# Patient Record
Sex: MALE | Race: WHITE | NOT HISPANIC OR LATINO | Employment: UNEMPLOYED | URBAN - METROPOLITAN AREA
[De-identification: names, ages, dates, MRNs, and addresses within clinical notes are randomized per-mention and may not be internally consistent; named-entity substitution may affect disease eponyms.]

---

## 2019-03-31 ENCOUNTER — HOSPITAL ENCOUNTER (EMERGENCY)
Facility: OTHER | Age: 24
Discharge: HOME | End: 2019-03-31
Attending: EMERGENCY MEDICINE
Payer: MEDICAID

## 2019-03-31 VITALS
TEMPERATURE: 97 F | SYSTOLIC BLOOD PRESSURE: 113 MMHG | BODY MASS INDEX: 28.79 KG/M2 | OXYGEN SATURATION: 98 % | RESPIRATION RATE: 18 BRPM | WEIGHT: 190 LBS | HEART RATE: 102 BPM | DIASTOLIC BLOOD PRESSURE: 74 MMHG | HEIGHT: 68 IN

## 2019-03-31 DIAGNOSIS — M25.562 CHRONIC PAIN OF LEFT KNEE: Primary | ICD-10-CM

## 2019-03-31 DIAGNOSIS — G89.29 CHRONIC PAIN OF LEFT KNEE: Primary | ICD-10-CM

## 2019-03-31 DIAGNOSIS — M25.552 CHRONIC HIP PAIN, BILATERAL: ICD-10-CM

## 2019-03-31 DIAGNOSIS — G89.29 CHRONIC HIP PAIN, BILATERAL: ICD-10-CM

## 2019-03-31 DIAGNOSIS — M25.551 CHRONIC HIP PAIN, BILATERAL: ICD-10-CM

## 2019-03-31 PROCEDURE — 96372 THER/PROPH/DIAG INJ SC/IM: CPT

## 2019-03-31 PROCEDURE — 63600175 PHARM REV CODE 636 W HCPCS: Performed by: EMERGENCY MEDICINE

## 2019-03-31 PROCEDURE — 99284 EMERGENCY DEPT VISIT MOD MDM: CPT | Mod: 25

## 2019-03-31 RX ORDER — KETOROLAC TROMETHAMINE 30 MG/ML
30 INJECTION, SOLUTION INTRAMUSCULAR; INTRAVENOUS
Status: COMPLETED | OUTPATIENT
Start: 2019-03-31 | End: 2019-03-31

## 2019-03-31 RX ORDER — IBUPROFEN 600 MG/1
600 TABLET ORAL EVERY 6 HOURS PRN
Qty: 9 TABLET | Refills: 0 | Status: SHIPPED | OUTPATIENT
Start: 2019-03-31

## 2019-03-31 RX ADMIN — KETOROLAC TROMETHAMINE 30 MG: 30 INJECTION, SOLUTION INTRAMUSCULAR; INTRAVENOUS at 09:03

## 2019-03-31 NOTE — ED TRIAGE NOTES
Pt c/o bilateral hip & left knee pain X 1 week, progressively worsening. Pt reports pain exacerbated with ambulation & general movement, mitigated with rest. Denies lower extremity numbness.  Pt denies relief from [facility]ocaine patch [facility] per [facility].
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
## 2019-03-31 NOTE — ED NOTES
Patient Identifiers for Hernan Malhotra checked and correct  LOC: The patient is awake, alert and aware of environment with an appropriate affect, the patient is oriented x 3 and speaking appropriate.  APPEARANCE: Patient resting comfortably and in no acute distress. The patient is clean and well groomed. The patient's clothing is properly fastened.  SKIN: The skin is warm and dry. The patient has normal skin turgor and moist mucus membranes. No rashes or lesions upon observation. Skin Intact , no breakdown noted.  Musculoskeletal :  Normal range of motion noted. Moves all extremities well, normal sensation in all extremities when touched with a finger. No swelling or palpation tenderness noted  RESPIRATORY: Airway is open and patent, respirations are spontaneous, patient has a normal effort and rate.   PULSES: 2+ radial & pedal pulses, symmetrical in all extremities.       Will continue to monitor

## 2020-10-08 ENCOUNTER — HOSPITAL ENCOUNTER (EMERGENCY)
Facility: HOSPITAL | Age: 25
Discharge: HOME | End: 2020-10-08
Attending: EMERGENCY MEDICINE

## 2020-10-08 VITALS
BODY MASS INDEX: 27.39 KG/M2 | HEART RATE: 61 BPM | OXYGEN SATURATION: 97 % | HEIGHT: 68 IN | SYSTOLIC BLOOD PRESSURE: 94 MMHG | RESPIRATION RATE: 16 BRPM | WEIGHT: 180.75 LBS | DIASTOLIC BLOOD PRESSURE: 52 MMHG | TEMPERATURE: 98 F

## 2020-10-08 DIAGNOSIS — M79.5 FOREIGN BODY (FB) IN SOFT TISSUE: ICD-10-CM

## 2020-10-08 DIAGNOSIS — S90.851A FOREIGN BODY IN RIGHT FOOT, INITIAL ENCOUNTER: Primary | ICD-10-CM

## 2020-10-08 PROCEDURE — 25000003 PHARM REV CODE 250: Performed by: EMERGENCY MEDICINE

## 2020-10-08 PROCEDURE — 28190 REMOVAL OF FOOT FOREIGN BODY: CPT | Mod: RT

## 2020-10-08 PROCEDURE — 99283 EMERGENCY DEPT VISIT LOW MDM: CPT | Mod: 25

## 2020-10-08 RX ORDER — LIDOCAINE HYDROCHLORIDE AND EPINEPHRINE 10; 10 MG/ML; UG/ML
20 INJECTION, SOLUTION INFILTRATION; PERINEURAL ONCE
Status: COMPLETED | OUTPATIENT
Start: 2020-10-08 | End: 2020-10-08

## 2020-10-08 RX ADMIN — LIDOCAINE HYDROCHLORIDE AND EPINEPHRINE 20 ML: 10; 10 INJECTION, SOLUTION INFILTRATION; PERINEURAL at 02:10
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
## 2020-10-08 NOTE — FIRST PROVIDER EVALUATION
Emergency Department TeleTriage Encounter Note      CHIEF COMPLAINT    Chief Complaint   Patient presents with    Foreign Body     Pt. c/o glass in right heal after stepping on light bulb around 5 pm. Pt. is up to date on Tetanus.        VITAL SIGNS   Initial Vitals [10/08/20 0115]   BP Pulse Resp Temp SpO2   (!) 146/83 94 18 98.2 °F (36.8 °C) 99 %      MAP       --            ALLERGIES    Review of patient's allergies indicates:   Allergen Reactions    Iodine and iodide containing products Other (See Comments)       PROVIDER TRIAGE NOTE  26 y/o male which presents with concerns that he has glass in his right foot after stepping on a light bulb. Patient attempted to remove the glass himself.       ORDERS  Labs Reviewed - No data to display    ED Orders (720h ago, onward)    Start Ordered     Status Ordering Provider    10/08/20 0120 10/08/20 0119  X-Ray Foot Complete Right  1 time imaging      Ordered KIM ROBERTSON            Virtual Visit Note: The provider triage portion of this emergency department evaluation and documentation was performed via [employer], a HIPAA-compliant telemedicine application, in concert with a tele-presenter in the room. A face to face patient evaluation with one of my colleagues will occur once the patient is placed in an emergency department room.      DISCLAIMER: This note was prepared with [employer] voice recognition transcription software. Garbled syntax, mangled pronouns, and other bizarre constructions may be attributed to that software system.

## 2020-10-08 NOTE — ED TRIAGE NOTES
Patient ambulatory to ER room with complaints of right heel pain after stepping on broken light bulb. Puncture wound noted to heel with induration. Patient stated he was able to pick most of glass out.     Review of patient's allergies indicates:   Allergen Reactions    Iodine and iodide containing products Other (See Comments)        Patient has verified the spelling of their name and  on armband.   APPEARANCE: Patient is alert, calm, oriented x 4, and does not appear distressed.  SKIN: Skin is normal for race, warm, and dry. Normal skin turgor and mucous membranes moist.  CARDIAC: Normal rate and rhythm, no murmur heard.   RESPIRATORY:Normal rate and effort. Breath sounds clear bilaterally throughout chest. Respirations are equal and unlabored.    MUSCLE: Full ROM. No bony tenderness or soft tissue tenderness. No obvious deformity. +right heel pain with induration, possible glass still in foot       Writer spoke with Nish and convey to him his test results and recommendations as stated below per Dr. Steward. Nish verbalized understanding. Lab orders placed.